# Patient Record
Sex: MALE | Race: WHITE | NOT HISPANIC OR LATINO | URBAN - METROPOLITAN AREA
[De-identification: names, ages, dates, MRNs, and addresses within clinical notes are randomized per-mention and may not be internally consistent; named-entity substitution may affect disease eponyms.]

---

## 2017-08-02 ENCOUNTER — OFFICE VISIT - HEALTHEAST (OUTPATIENT)
Dept: FAMILY MEDICINE | Facility: CLINIC | Age: 37
End: 2017-08-02

## 2017-08-02 DIAGNOSIS — R51.9 HEADACHE: ICD-10-CM

## 2017-08-02 DIAGNOSIS — M79.10 MYALGIA: ICD-10-CM

## 2017-08-02 RX ORDER — IBUPROFEN 200 MG
200 TABLET ORAL EVERY 6 HOURS PRN
Status: SHIPPED | COMMUNITY
Start: 2017-08-02

## 2017-08-03 ENCOUNTER — COMMUNICATION - HEALTHEAST (OUTPATIENT)
Dept: FAMILY MEDICINE | Facility: CLINIC | Age: 37
End: 2017-08-03

## 2021-05-31 VITALS — WEIGHT: 155.8 LBS | BODY MASS INDEX: 23.01 KG/M2

## 2021-06-12 NOTE — PROGRESS NOTES
Subjective:      Patient ID: Arjun Crowder is a 36 y.o. male.    Chief Complaint:    HPI  Patient comes in for evaluation of a tick bite on the right lateral abdomen that occurred about a week ago.  He actually saw the tick crawling on him in before he was able to grab it off it had bit him.  Since then he is also been complaining of a posterior headache with some radiation down his neck.  He has been having some myalgias and some knee stiffness.    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  He has significant tick exposure as well as mosquito exposure.  Several people in his work group were diagnosed flight disease.  Is concerned about possible Lyme disease.    Past Medical History:   Diagnosis Date     Hx of anaphylaxis     Due to multiple bee stings.       No past surgical history on file.    No family history on file.    Social History   Substance Use Topics     Smoking status: Not on file     Smokeless tobacco: Not on file     Alcohol use Not on file       Review of Systems    Objective:     /58  Pulse 65  Temp 98.8  F (37.1  C) (Oral)   Resp 12  Wt 155 lb 12.8 oz (70.7 kg)  SpO2 100%  BMI 23.01 kg/m2    Physical Exam   Constitutional: He appears well-developed and well-nourished. No distress.   HENT:   Mouth/Throat: Oropharynx is clear and moist.   Neck: Neck supple.   Slight tenderness in the upper neck near the occipital protuberances as well as down in the upper trapezius muscles.  No midline tenderness.  Neck is supple and is able look at the ceiling and put his chin towards his chest without any difficulty.   Cardiovascular: Normal rate.    Pulmonary/Chest: Effort normal. No respiratory distress.   Musculoskeletal: Normal range of motion.   Neurological: He is alert.   Skin: Skin is warm and dry. No rash noted.   Single punctate lesion on the  right lateral abdomen.  No surrounding redness or increased warmth.       Assessment and Plan:      Procedures    The primary encounter diagnosis was Headache. A diagnosis of Myalgia was also pertinent to this visit.     Possible Lyme disease with new onset myalgia and headaches.  Patient has significant risk factors for possible Lyme disease.    I will treat him for Lyme disease with doxycycline.  I recommend continuing the antibiotic even  if the Lyme antibody test comes back negative.      Patient Instructions     Symptoms consistent with lyme disease.    Will test     Start the antibiotic and continue even if the blood test is negative.

## 2025-03-04 ENCOUNTER — HOSPITAL ENCOUNTER (EMERGENCY)
Facility: HOSPITAL | Age: 45
Discharge: HOME | End: 2025-03-04
Admitting: PHYSICIAN ASSISTANT
Payer: COMMERCIAL

## 2025-03-04 ENCOUNTER — APPOINTMENT (OUTPATIENT)
Dept: MRI IMAGING | Facility: HOSPITAL | Age: 45
End: 2025-03-04
Attending: PHYSICIAN ASSISTANT
Payer: COMMERCIAL

## 2025-03-04 VITALS
OXYGEN SATURATION: 98 % | TEMPERATURE: 99 F | WEIGHT: 162.1 LBS | DIASTOLIC BLOOD PRESSURE: 107 MMHG | SYSTOLIC BLOOD PRESSURE: 153 MMHG | BODY MASS INDEX: 24.57 KG/M2 | HEART RATE: 110 BPM | HEIGHT: 68 IN | RESPIRATION RATE: 17 BRPM

## 2025-03-04 DIAGNOSIS — Q27.30 AVM (ARTERIOVENOUS MALFORMATION): ICD-10-CM

## 2025-03-04 DIAGNOSIS — R51.9 SUDDEN ONSET OF SEVERE HEADACHE: ICD-10-CM

## 2025-03-04 DIAGNOSIS — R41.89 EPISODE OF ALTERED COGNITION: ICD-10-CM

## 2025-03-04 LAB
ANION GAP SERPL CALCULATED.3IONS-SCNC: 12 MMOL/L (ref 7–15)
APTT PPP: 27 SECONDS (ref 22–38)
BASOPHILS # BLD AUTO: 0.1 10E3/UL (ref 0–0.2)
BASOPHILS NFR BLD AUTO: 1 %
BUN SERPL-MCNC: 10.9 MG/DL (ref 6–20)
CALCIUM SERPL-MCNC: 10.2 MG/DL (ref 8.8–10.4)
CHLORIDE SERPL-SCNC: 103 MMOL/L (ref 98–107)
CREAT SERPL-MCNC: 0.88 MG/DL (ref 0.67–1.17)
EGFRCR SERPLBLD CKD-EPI 2021: >90 ML/MIN/1.73M2
EOSINOPHIL # BLD AUTO: 0.2 10E3/UL (ref 0–0.7)
EOSINOPHIL NFR BLD AUTO: 2 %
ERYTHROCYTE [DISTWIDTH] IN BLOOD BY AUTOMATED COUNT: 13.2 % (ref 10–15)
GLUCOSE SERPL-MCNC: 124 MG/DL (ref 70–99)
HCO3 SERPL-SCNC: 25 MMOL/L (ref 22–29)
HCT VFR BLD AUTO: 45 % (ref 40–53)
HGB BLD-MCNC: 14.9 G/DL (ref 13.3–17.7)
IMM GRANULOCYTES # BLD: 0 10E3/UL
IMM GRANULOCYTES NFR BLD: 0 %
INR PPP: 0.92 (ref 0.85–1.15)
LYMPHOCYTES # BLD AUTO: 2 10E3/UL (ref 0.8–5.3)
LYMPHOCYTES NFR BLD AUTO: 22 %
MCH RBC QN AUTO: 29.4 PG (ref 26.5–33)
MCHC RBC AUTO-ENTMCNC: 33.1 G/DL (ref 31.5–36.5)
MCV RBC AUTO: 89 FL (ref 78–100)
MONOCYTES # BLD AUTO: 0.7 10E3/UL (ref 0–1.3)
MONOCYTES NFR BLD AUTO: 8 %
NEUTROPHILS # BLD AUTO: 6 10E3/UL (ref 1.6–8.3)
NEUTROPHILS NFR BLD AUTO: 67 %
NRBC # BLD AUTO: 0 10E3/UL
NRBC BLD AUTO-RTO: 0 /100
PLATELET # BLD AUTO: 331 10E3/UL (ref 150–450)
POTASSIUM SERPL-SCNC: 4.2 MMOL/L (ref 3.4–5.3)
RBC # BLD AUTO: 5.07 10E6/UL (ref 4.4–5.9)
SODIUM SERPL-SCNC: 140 MMOL/L (ref 135–145)
TROPONIN T SERPL HS-MCNC: <6 NG/L
WBC # BLD AUTO: 8.9 10E3/UL (ref 4–11)

## 2025-03-04 PROCEDURE — 36415 COLL VENOUS BLD VENIPUNCTURE: CPT | Performed by: PHYSICIAN ASSISTANT

## 2025-03-04 PROCEDURE — 255N000002 HC RX 255 OP 636: Performed by: PHYSICIAN ASSISTANT

## 2025-03-04 PROCEDURE — 82374 ASSAY BLOOD CARBON DIOXIDE: CPT | Performed by: PHYSICIAN ASSISTANT

## 2025-03-04 PROCEDURE — A9585 GADOBUTROL INJECTION: HCPCS | Performed by: PHYSICIAN ASSISTANT

## 2025-03-04 PROCEDURE — 85730 THROMBOPLASTIN TIME PARTIAL: CPT | Performed by: PHYSICIAN ASSISTANT

## 2025-03-04 PROCEDURE — 93005 ELECTROCARDIOGRAM TRACING: CPT | Performed by: PHYSICIAN ASSISTANT

## 2025-03-04 PROCEDURE — 84484 ASSAY OF TROPONIN QUANT: CPT | Performed by: PHYSICIAN ASSISTANT

## 2025-03-04 PROCEDURE — 70544 MR ANGIOGRAPHY HEAD W/O DYE: CPT

## 2025-03-04 PROCEDURE — 85610 PROTHROMBIN TIME: CPT | Performed by: PHYSICIAN ASSISTANT

## 2025-03-04 PROCEDURE — 80048 BASIC METABOLIC PNL TOTAL CA: CPT | Performed by: PHYSICIAN ASSISTANT

## 2025-03-04 PROCEDURE — 85004 AUTOMATED DIFF WBC COUNT: CPT | Performed by: PHYSICIAN ASSISTANT

## 2025-03-04 PROCEDURE — 70549 MR ANGIOGRAPH NECK W/O&W/DYE: CPT

## 2025-03-04 PROCEDURE — 99285 EMERGENCY DEPT VISIT HI MDM: CPT | Mod: 25

## 2025-03-04 PROCEDURE — 70553 MRI BRAIN STEM W/O & W/DYE: CPT

## 2025-03-04 RX ORDER — GADOBUTROL 604.72 MG/ML
8 INJECTION INTRAVENOUS ONCE
Status: COMPLETED | OUTPATIENT
Start: 2025-03-04 | End: 2025-03-04

## 2025-03-04 RX ADMIN — GADOBUTROL 8 ML: 604.72 INJECTION INTRAVENOUS at 16:37

## 2025-03-04 ASSESSMENT — COLUMBIA-SUICIDE SEVERITY RATING SCALE - C-SSRS
2. HAVE YOU ACTUALLY HAD ANY THOUGHTS OF KILLING YOURSELF IN THE PAST MONTH?: NO
6. HAVE YOU EVER DONE ANYTHING, STARTED TO DO ANYTHING, OR PREPARED TO DO ANYTHING TO END YOUR LIFE?: NO
1. IN THE PAST MONTH, HAVE YOU WISHED YOU WERE DEAD OR WISHED YOU COULD GO TO SLEEP AND NOT WAKE UP?: NO

## 2025-03-04 ASSESSMENT — ACTIVITIES OF DAILY LIVING (ADL)
ADLS_ACUITY_SCORE: 41
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
## 2025-03-05 ENCOUNTER — TELEPHONE (OUTPATIENT)
Dept: NEUROSURGERY | Facility: CLINIC | Age: 45
End: 2025-03-05
Payer: COMMERCIAL

## 2025-03-05 LAB
ATRIAL RATE - MUSE: 83 BPM
DIASTOLIC BLOOD PRESSURE - MUSE: NORMAL MMHG
INTERPRETATION ECG - MUSE: NORMAL
P AXIS - MUSE: 72 DEGREES
PR INTERVAL - MUSE: 142 MS
QRS DURATION - MUSE: 82 MS
QT - MUSE: 354 MS
QTC - MUSE: 415 MS
R AXIS - MUSE: 2 DEGREES
SYSTOLIC BLOOD PRESSURE - MUSE: NORMAL MMHG
T AXIS - MUSE: 27 DEGREES
VENTRICULAR RATE- MUSE: 83 BPM

## 2025-03-05 NOTE — TELEPHONE ENCOUNTER
RECORDS RECEIVED FROM: Internal    REASON FOR VISIT: AVM (arteriovenous malformation)   PROVIDER: Rosana Rosas MD   DATE OF APPT: 3/12/25 @ 3:00 pm    NOTES (FOR ALL VISITS) STATUS DETAILS   OFFICE NOTE from referring provider Internal ED Referral    DISCHARGE REPORT from the ER Internal 3/4/25 Tremayne Stone PA-C @[facility] ED     MEDICATION LIST Internal    IMAGING  (FOR ALL VISITS)     MRI (HEAD, NECK, SPINE) Internal [facility]  3/4/25 MRA Neck (Carotid)  3/4/25 MRA Brain (COW)  3/4/25 MR Brain
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
## 2025-03-05 NOTE — DISCHARGE INSTRUCTIONS
You were seen here in the emergency department for evaluation of altered mental status, noted to have an AVM malformation within the left temporal portion of your brain.  I discussed with both community neurosurgery here at [facility] and [facility] neurosurgery who recommends follow-up with Dr. Rosas, neurosurgery at the [facility].  A referral has been placed in your information has been passed on, they will contact you for follow-up appointments.  If you develop new or worsening symptoms such as sudden onset headache, vision changes, numbness or tingling, return to the emergency department immediately for further evaluation.

## 2025-03-12 ENCOUNTER — PRE VISIT (OUTPATIENT)
Dept: NEUROSURGERY | Facility: CLINIC | Age: 45
End: 2025-03-12

## 2025-03-12 ENCOUNTER — VIRTUAL VISIT (OUTPATIENT)
Dept: NEUROSURGERY | Facility: CLINIC | Age: 45
End: 2025-03-12
Payer: COMMERCIAL

## 2025-03-12 DIAGNOSIS — Q27.30 AVM (ARTERIOVENOUS MALFORMATION): ICD-10-CM

## 2025-03-12 PROCEDURE — 98011 SYNCH AUDIO-ONLY NEW HIGH 60: CPT | Performed by: NEUROLOGICAL SURGERY

## 2025-03-12 PROCEDURE — 1126F AMNT PAIN NOTED NONE PRSNT: CPT | Performed by: NEUROLOGICAL SURGERY

## 2025-03-12 ASSESSMENT — PAIN SCALES - GENERAL: PAINLEVEL_OUTOF10: NO PAIN (0)
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
## 2025-03-12 NOTE — NURSING NOTE
Is the patient currently in the state of [place]? YES    Visit mode:TELEPHONE    If the visit is dropped, the patient can be reconnected by: TELEPHONE VISIT: Phone number:   Telephone Information:   Mobile [phone]       Will anyone else be joining the visit? NO  (If patient encounters technical issues they should call [phone] :[id number])    How would you like to obtain your AVS? MyChart    Are changes needed to the allergy or medication list? No    Are refills needed on medications prescribed by this physician? NO    Reason for visit: Consult    Caroline ROBERT

## 2025-03-13 ENCOUNTER — TELEPHONE (OUTPATIENT)
Dept: NEUROSURGERY | Facility: CLINIC | Age: 45
End: 2025-03-13
Payer: COMMERCIAL

## 2025-03-13 ENCOUNTER — HOSPITAL ENCOUNTER (OUTPATIENT)
Facility: CLINIC | Age: 45
End: 2025-03-13
Admitting: NEUROLOGICAL SURGERY
Payer: COMMERCIAL

## 2025-03-13 NOTE — TELEPHONE ENCOUNTER
I called patient in regards to IR procedure with Dr. Bob Rosas    Procedure Date: 3/21/2025    Arrival: 1030    Loction: [facility] IR [facility]    Anesthesia Type: IV Sedation    Patient was informed that they will be at the hospital 5-6 hours and that they will need a . Patient verbalized understanding.    Notes:   - Patient is aware that the time can change due to the EEG being scheduled prior to diagnostic angiogram        Dexter Cortez on 3/13/2025 at 12:32 PM ]

## 2025-03-13 NOTE — PATIENT INSTRUCTIONS
Scheduling will contact you to make arrangements for your angiogram and will try to schedule EEG the same morning. You will need a  home and someone that can stay with you through the night. Do not eat for 8 hours prior to arrival time. You may drink clear liquids (includes water, Jell-O, clear broth, apple juice or any non-carbonated beverage that you can see through) until 2 hours prior to arrival time. You may take your medications with a sip of water. You will check in at the [facility] waiting room at the [facility] 1.5 hours prior to your procedure. You will receive written instructions via mail after your procedure has been scheduled.      If you have questions regarding your procedure, please contact us at [phone], option 1.    If you need to cancel, reschedule or have procedure scheduling related questions, please call Neuroendovascular IR Procedure Scheduling at [phone].     Thank you,  Lucía Richards, RN & Queenie Hutson RN, CNRN, SCRN  Stroke & Endovascular Care Coordinators

## 2025-03-31 ENCOUNTER — PATIENT OUTREACH (OUTPATIENT)
Dept: NEUROSURGERY | Facility: CLINIC | Age: 45
End: 2025-03-31
Payer: COMMERCIAL

## 2025-03-31 NOTE — PATIENT INSTRUCTIONS
You are scheduled for a Diagnostic Cerebral Angiogram with Dr. Rosas on 4/16/25.     EEG Arrival Time: 7:45am.   EEG Time: 8:00am.  Angiogram Arrival: 10:30am  Angiogram start time: 12:00pm    Please follow these instructions:    *For the EEG: please arrive to [address], [address] [facility] by 7:45am for check in. The EEG will begin at 8am.    * For the angiogram: Check in at the [facility] Interventional [facility]iology. The address is [address] [place] [address]. Enter at door 2, closest to [address]. The phone number is [phone].     * Nothing to eat for 8 hours prior to arrival time. You may drink clear liquids (includes water, Jell-O, clear broth, apple juice or any non-carbonated beverage that you can see through) up until 2 hours prior to arrival time.     * Please note: if you are taking a GLP-1 agonist you must HOLD as follows:   Hold seven (7) days prior for once weekly injectable doses [semaglutide (Ozempic, Wegovy), dulaglutide  (Trulicity), exenatide ER (Bydureon), tirzepatide (Mounjaro)]   Hold the day before and day of for once daily injectable GLP-1 agonists [exenatide (Byetta), liraglutide (Saxenda,  Victoza)]   Hold seven (7) days for oral semaglutide (Rybelsus)     * You may take your other medications with a sip of water the morning of the procedure.    * You will be discharged the same day. You must have a  home and someone that can stay with you through the night.     COVID-19 visitors policy: Adult surgical and procedural patients can have two visitors throughout the surgery process. The two visitors may include children of any age; please note, children cannot stay in the waiting room alone.    All discharge instructions will be given to the  or volunteer. Documentation for the post-operative plan will be given to the patient and . Patients are required to have someone to stay with them for 24 hours after their  procedure.    If you have questions regarding your procedure, please contact me at [phone], option 1.    If you need to cancel, reschedule or have procedure scheduling related questions, please call Neuroendovascular IR Procedure Scheduling at [phone].    Thank you,  Queenie Hutson, RN, CNRN, SCRN  Lucía Richards, RN, BSN  Stroke & Neuroendovascular Care Coordinator    Cerebral Angiogram - What to Expect    You are scheduled for an angiogram, which is a procedure that uses x-rays to view the arteries (blood vessels that carry blood from the heart out to the body).     After you arrive, the nursing staff will take a short history and assessment before the procedure begins. Your physician will explain the procedure to you and your family, including the possible risks and side effects.  Be sure to ask questions and address any concerns you may have. You will then be asked to sign a consent form for the procedure.     During the procedure a small tube or catheter will be placed into one of your arteries (either the femoral artery in the groin, or the radial artery in the arm)  and maneuvered to the specific artery being studied.  Contrast medium (x-ray dye) will be injected into the blood vessel and x-rays will be taken of the area.    Once the procedure is completed the catheter will be removed and pressure held at the puncture site for 20 to 30 minutes to make sure the puncture site seals. An internal closure device may be used to secure the arterial puncture as well.     During the exam, you are routinely monitored by a heart monitor and an oxygen saturation monitor that lets us know how well you are breathing.  A blood pressure cuff will be on your arm.  An IV is started to provide you with medications and IV fluids during the procedure.    After the exam you will need to be on complete bed rest for 2 to 6 hours.  The nurse will monitor your vital signs, puncture site, pulses and temperature of the area  that was punctured.     After you are discharged do not drive until the next morning and an adult must be with you until then. You may resume your normal activities the day after the procedure.  Do not do any strenuous exercise or lifting for 48 hours after the procedure.     Preparation:   Laboratory tests will be obtained by your doctor the day of the exam (Basic Metabolic Panel, CBCP, PTT and INR).  Someone will need to drive you to and from the hospital.  Be sure to have someone who can stay with you through the night.   If you are allergic to x-ray contrast or iodine, contact your doctor or the nurse coordinator prior to the exam day for instructions.  If you are or may be pregnant contact your doctor or nurse coordinator prior to the day of the exam.  If you have diabetes, check with your doctor or the RN Care Coordinator to see if your insulin should be adjusted for the exam.  If you are taking a medication called Glucophage, Glucovance, or Metformin; these medications need to be held the day of the exam and for approximately 48 hours following the procedure.   If you are taking a GLP-1 agonist, please hold as instructed above.   If you are taking Coumadin (warfarin), Eliquis (apixaban), Xarelto (rivaroxaban), or any other blood thinners/anticoagulants, please contact the RN Care Coordinator at least 7 days before the exam for special instructions.  You should not have received barium (x-ray contrast) within 48 hours of this procedure.   Do not smoke for 24 hours prior to the procedure.  Do not eat for 8 hours prior to arrival time. You may drink clear liquids (water, ginger ale, etc) until 2 hours prior to arrival time.  You may brush your teeth and take your medications as directed with a sip of water.    Feel free to contact Queenie Hutson or Lucía Richards, our RN Care Coordinators, at [phone] with any questions or concerns. After business hours, call the  at [phone] and have the  Neuro-Interventional Fellow paged.

## 2025-03-31 NOTE — PROGRESS NOTES
Informed patient of procedure instructions. Confirmed date and time. Patient verbalized understanding. All questions answered. Patient instructions sent via Xtone. Patient has my contact information and was encouraged to call with questions/concerns.   Lucía Richards RN 3/31/2025 9:48 AM

## 2025-04-06 ENCOUNTER — HEALTH MAINTENANCE LETTER (OUTPATIENT)
Age: 45
End: 2025-04-06

## 2025-04-16 ENCOUNTER — MYC MEDICAL ADVICE (OUTPATIENT)
Dept: NEUROSURGERY | Facility: CLINIC | Age: 45
End: 2025-04-16

## 2025-04-16 ENCOUNTER — APPOINTMENT (OUTPATIENT)
Dept: INTERVENTIONAL RADIOLOGY/VASCULAR | Facility: CLINIC | Age: 45
End: 2025-04-16
Attending: NEUROLOGICAL SURGERY
Payer: COMMERCIAL

## 2025-04-16 ENCOUNTER — ANCILLARY PROCEDURE (OUTPATIENT)
Dept: NEUROLOGY | Facility: CLINIC | Age: 45
End: 2025-04-16
Attending: NEUROLOGICAL SURGERY
Payer: COMMERCIAL

## 2025-04-16 ENCOUNTER — HOSPITAL ENCOUNTER (OUTPATIENT)
Facility: CLINIC | Age: 45
Discharge: HOME | End: 2025-04-16
Admitting: NEUROLOGICAL SURGERY
Payer: COMMERCIAL

## 2025-04-16 VITALS
WEIGHT: 151 LBS | RESPIRATION RATE: 16 BRPM | HEIGHT: 69 IN | SYSTOLIC BLOOD PRESSURE: 117 MMHG | TEMPERATURE: 97.9 F | DIASTOLIC BLOOD PRESSURE: 75 MMHG | HEART RATE: 72 BPM | BODY MASS INDEX: 22.36 KG/M2 | OXYGEN SATURATION: 98 %

## 2025-04-16 DIAGNOSIS — Q27.30 AVM (ARTERIOVENOUS MALFORMATION): ICD-10-CM

## 2025-04-16 LAB
ANION GAP SERPL CALCULATED.3IONS-SCNC: 13 MMOL/L (ref 7–15)
APTT PPP: 28 SECONDS (ref 22–38)
BUN SERPL-MCNC: 13.3 MG/DL (ref 6–20)
CALCIUM SERPL-MCNC: 9.2 MG/DL (ref 8.8–10.4)
CHLORIDE SERPL-SCNC: 102 MMOL/L (ref 98–107)
CREAT SERPL-MCNC: 0.89 MG/DL (ref 0.67–1.17)
EGFRCR SERPLBLD CKD-EPI 2021: >90 ML/MIN/1.73M2
ERYTHROCYTE [DISTWIDTH] IN BLOOD BY AUTOMATED COUNT: 13.1 % (ref 10–15)
GLUCOSE SERPL-MCNC: 108 MG/DL (ref 70–99)
HCO3 SERPL-SCNC: 25 MMOL/L (ref 22–29)
HCT VFR BLD AUTO: 43.4 % (ref 40–53)
HGB BLD-MCNC: 14.5 G/DL (ref 13.3–17.7)
INR PPP: 0.95 (ref 0.85–1.15)
MCH RBC QN AUTO: 29.9 PG (ref 26.5–33)
MCHC RBC AUTO-ENTMCNC: 33.4 G/DL (ref 31.5–36.5)
MCV RBC AUTO: 90 FL (ref 78–100)
PLATELET # BLD AUTO: 302 10E3/UL (ref 150–450)
POTASSIUM SERPL-SCNC: 3.7 MMOL/L (ref 3.4–5.3)
RBC # BLD AUTO: 4.85 10E6/UL (ref 4.4–5.9)
SODIUM SERPL-SCNC: 140 MMOL/L (ref 135–145)
WBC # BLD AUTO: 7.9 10E3/UL (ref 4–11)

## 2025-04-16 PROCEDURE — 85014 HEMATOCRIT: CPT | Performed by: INTERNAL MEDICINE

## 2025-04-16 PROCEDURE — 85610 PROTHROMBIN TIME: CPT | Performed by: INTERNAL MEDICINE

## 2025-04-16 PROCEDURE — 36227 PLACE CATH XTRNL CAROTID: CPT | Mod: LT | Performed by: NEUROLOGICAL SURGERY

## 2025-04-16 PROCEDURE — 258N000003 HC RX IP 258 OP 636: Performed by: INTERNAL MEDICINE

## 2025-04-16 PROCEDURE — 95713 VEEG 2-12 HR CONT MNTR: CPT | Performed by: STUDENT IN AN ORGANIZED HEALTH CARE EDUCATION/TRAINING PROGRAM

## 2025-04-16 PROCEDURE — 250N000009 HC RX 250: Performed by: INTERNAL MEDICINE

## 2025-04-16 PROCEDURE — 85730 THROMBOPLASTIN TIME PARTIAL: CPT | Performed by: INTERNAL MEDICINE

## 2025-04-16 PROCEDURE — 250N000011 HC RX IP 250 OP 636: Performed by: INTERNAL MEDICINE

## 2025-04-16 PROCEDURE — 95718 EEG PHYS/QHP 2-12 HR W/VEEG: CPT | Performed by: STUDENT IN AN ORGANIZED HEALTH CARE EDUCATION/TRAINING PROGRAM

## 2025-04-16 PROCEDURE — 99152 MOD SED SAME PHYS/QHP 5/>YRS: CPT | Mod: GC | Performed by: NEUROLOGICAL SURGERY

## 2025-04-16 PROCEDURE — 36224 PLACE CATH CAROTD ART: CPT | Mod: XS | Performed by: NEUROLOGICAL SURGERY

## 2025-04-16 PROCEDURE — C1769 GUIDE WIRE: HCPCS

## 2025-04-16 PROCEDURE — 36224 PLACE CATH CAROTD ART: CPT | Mod: 50

## 2025-04-16 PROCEDURE — 80048 BASIC METABOLIC PNL TOTAL CA: CPT | Performed by: INTERNAL MEDICINE

## 2025-04-16 PROCEDURE — C1887 CATHETER, GUIDING: HCPCS

## 2025-04-16 PROCEDURE — 36224 PLACE CATH CAROTD ART: CPT | Mod: RT | Performed by: NEUROLOGICAL SURGERY

## 2025-04-16 PROCEDURE — 255N000002 HC RX 255 OP 636: Performed by: NEUROLOGICAL SURGERY

## 2025-04-16 PROCEDURE — 36415 COLL VENOUS BLD VENIPUNCTURE: CPT | Performed by: INTERNAL MEDICINE

## 2025-04-16 PROCEDURE — 272N000567 HC SHEATH CR4

## 2025-04-16 PROCEDURE — 95700 EEG CONT REC W/VID EEG TECH: CPT | Performed by: STUDENT IN AN ORGANIZED HEALTH CARE EDUCATION/TRAINING PROGRAM

## 2025-04-16 PROCEDURE — 36226 PLACE CATH VERTEBRAL ART: CPT | Mod: LT | Performed by: NEUROLOGICAL SURGERY

## 2025-04-16 RX ORDER — LIDOCAINE 40 MG/G
CREAM TOPICAL
Status: DISCONTINUED | OUTPATIENT
Start: 2025-04-16 | End: 2025-04-16 | Stop reason: HOSPADM

## 2025-04-16 RX ORDER — NALOXONE HYDROCHLORIDE 0.4 MG/ML
0.2 INJECTION, SOLUTION INTRAMUSCULAR; INTRAVENOUS; SUBCUTANEOUS
Status: DISCONTINUED | OUTPATIENT
Start: 2025-04-16 | End: 2025-04-16 | Stop reason: HOSPADM

## 2025-04-16 RX ORDER — SODIUM CHLORIDE 9 MG/ML
INJECTION, SOLUTION INTRAVENOUS CONTINUOUS
Status: DISCONTINUED | OUTPATIENT
Start: 2025-04-16 | End: 2025-04-16 | Stop reason: HOSPADM

## 2025-04-16 RX ORDER — HEPARIN SODIUM 200 [USP'U]/100ML
1 INJECTION, SOLUTION INTRAVENOUS CONTINUOUS PRN
Status: DISCONTINUED | OUTPATIENT
Start: 2025-04-16 | End: 2025-04-16 | Stop reason: HOSPADM

## 2025-04-16 RX ORDER — FENTANYL CITRATE 50 UG/ML
25-50 INJECTION, SOLUTION INTRAMUSCULAR; INTRAVENOUS EVERY 5 MIN PRN
Status: DISCONTINUED | OUTPATIENT
Start: 2025-04-16 | End: 2025-04-16 | Stop reason: HOSPADM

## 2025-04-16 RX ORDER — PROCHLORPERAZINE MALEATE 10 MG
10 TABLET ORAL EVERY 6 HOURS PRN
Status: DISCONTINUED | OUTPATIENT
Start: 2025-04-16 | End: 2025-04-16 | Stop reason: HOSPADM

## 2025-04-16 RX ORDER — NALOXONE HYDROCHLORIDE 0.4 MG/ML
0.4 INJECTION, SOLUTION INTRAMUSCULAR; INTRAVENOUS; SUBCUTANEOUS
Status: DISCONTINUED | OUTPATIENT
Start: 2025-04-16 | End: 2025-04-16 | Stop reason: HOSPADM

## 2025-04-16 RX ORDER — ONDANSETRON 2 MG/ML
4 INJECTION INTRAMUSCULAR; INTRAVENOUS EVERY 6 HOURS PRN
Status: DISCONTINUED | OUTPATIENT
Start: 2025-04-16 | End: 2025-04-16 | Stop reason: HOSPADM

## 2025-04-16 RX ORDER — FLUMAZENIL 0.1 MG/ML
0.2 INJECTION, SOLUTION INTRAVENOUS
Status: DISCONTINUED | OUTPATIENT
Start: 2025-04-16 | End: 2025-04-16 | Stop reason: HOSPADM

## 2025-04-16 RX ORDER — ONDANSETRON 4 MG/1
4 TABLET, ORALLY DISINTEGRATING ORAL EVERY 6 HOURS PRN
Status: DISCONTINUED | OUTPATIENT
Start: 2025-04-16 | End: 2025-04-16 | Stop reason: HOSPADM

## 2025-04-16 RX ORDER — IOPAMIDOL 612 MG/ML
100 INJECTION, SOLUTION INTRAVASCULAR ONCE
Status: COMPLETED | OUTPATIENT
Start: 2025-04-16 | End: 2025-04-16

## 2025-04-16 RX ORDER — ACETAMINOPHEN 325 MG/1
325 TABLET ORAL EVERY 4 HOURS PRN
Status: DISCONTINUED | OUTPATIENT
Start: 2025-04-16 | End: 2025-04-16 | Stop reason: HOSPADM

## 2025-04-16 RX ORDER — HEPARIN SODIUM 200 [USP'U]/100ML
1 INJECTION, SOLUTION INTRAVENOUS EVERY 5 MIN PRN
Status: DISCONTINUED | OUTPATIENT
Start: 2025-04-16 | End: 2025-04-16 | Stop reason: HOSPADM

## 2025-04-16 RX ADMIN — MIDAZOLAM 1 MG: 1 INJECTION INTRAMUSCULAR; INTRAVENOUS at 13:34

## 2025-04-16 RX ADMIN — FENTANYL CITRATE 50 MCG: 50 INJECTION, SOLUTION INTRAMUSCULAR; INTRAVENOUS at 13:28

## 2025-04-16 RX ADMIN — MIDAZOLAM 1 MG: 1 INJECTION INTRAMUSCULAR; INTRAVENOUS at 13:15

## 2025-04-16 RX ADMIN — MIDAZOLAM 1 MG: 1 INJECTION INTRAMUSCULAR; INTRAVENOUS at 13:28

## 2025-04-16 RX ADMIN — IOPAMIDOL 70 ML: 612 INJECTION, SOLUTION INTRAVENOUS at 13:40

## 2025-04-16 RX ADMIN — LIDOCAINE HYDROCHLORIDE 10 ML: 10 INJECTION, SOLUTION INFILTRATION; PERINEURAL at 13:22

## 2025-04-16 RX ADMIN — FENTANYL CITRATE 50 MCG: 50 INJECTION, SOLUTION INTRAMUSCULAR; INTRAVENOUS at 13:21

## 2025-04-16 RX ADMIN — MIDAZOLAM 1 MG: 1 INJECTION INTRAMUSCULAR; INTRAVENOUS at 13:22

## 2025-04-16 RX ADMIN — FENTANYL CITRATE 50 MCG: 50 INJECTION, SOLUTION INTRAMUSCULAR; INTRAVENOUS at 13:10

## 2025-04-16 RX ADMIN — FENTANYL CITRATE 50 MCG: 50 INJECTION, SOLUTION INTRAMUSCULAR; INTRAVENOUS at 13:16

## 2025-04-16 RX ADMIN — SODIUM CHLORIDE: 0.9 INJECTION, SOLUTION INTRAVENOUS at 12:35

## 2025-04-16 RX ADMIN — MIDAZOLAM 1 MG: 1 INJECTION INTRAMUSCULAR; INTRAVENOUS at 13:10

## 2025-04-16 ASSESSMENT — ACTIVITIES OF DAILY LIVING (ADL)
ADLS_ACUITY_SCORE: 41

## 2025-04-16 NOTE — PROGRESS NOTES
Care Suites Post Procedure Note    Patient Information  Name: Arjun Crowder  Age: 44 year old    Post Procedure  Time patient returned to Care Suites: 1350  Concerns/abnormal assessment: N/A, Neuro's intact ex L bottom side of L foot numbness present PTA. R groin dressing CDI  If abnormal assessment, provider notified: N/A  Plan/Other: per orders     Amy Jenkins RN
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
## 2025-04-16 NOTE — IR NOTE
Interventional Radiology Intra-procedural Nursing Note    Patient Name: Arjun Crowder  Medical Record Number: [id number]  Today's Date: April 16, 2025    Procedure: diagnostic cerebral angiogram with moderate sedation  Start time: 1310  End time: 1335  Report provided to: Tima SUNG  Patient depart time and location: 1345 to cs04    Note: Patient entered Interventional Radiology Suite number 3 via cart. Patient awake, alert and oriented. Assisted onto procedural table in supine position. Prepped and draped.  Dr. Jhaveri in room. Time out and procedure started. Vital signs stable. Telemetry reading sinus rhythm.    Procedure well tolerated by patient without complications. Procedure end with debrief by Dr. Jhaveri.  Manual pressure applied until hemostasis achieved. Gauze/tegaderm dressing applied to right femoral interventional procedure access site.    Bedrest for 2 hours until 1535.    Administered medication totals:  Lidocaine 1% 10 mL Intradermal  Versed 5 mg IVP  Fentanyl 200 mcg IVP    Last dose of sedation administered at 1334.

## 2025-04-16 NOTE — PROGRESS NOTES
Care Suites Discharge Nursing Note    Patient Information  Name: Arjun Crowder  Age: 44 year old    Discharge Education:  Discharge instructions reviewed: Yes  Patient/patient representative verbalizes understanding: Yes  Patient discharging on new medications: No  Medication education completed: Yes    Discharge Plans:   Discharge location: home  Discharge ride contacted: Yes  Approximate discharge time: 1630    Discharge Criteria:  Discharge criteria met and vital signs stable: Yes    Patient Belongs:  Patient belongings returned to patient: Yes    Amy Jenkins RN

## 2025-04-16 NOTE — DISCHARGE INSTRUCTIONS
Cerebral Angiogram Discharge Instructions - Femoral     After you go home:    Have an adult stay with you until tomorrow.  Drink extra fluids for 2 days.  You may resume your normal diet.  No smoking       For 24 hours - due to the sedation you received:  Relax and take it easy.  Do NOT make any important or legal decisions.  Do NOT drive or operate machines at home or at work.  Do NOT drink alcohol.    Care of Groin Puncture Site:    For the first 24 hrs - check the puncture site every 1-2 hours while awake.  For 2 days, when you cough, sneeze, laugh or move your bowels, hold your hand over the puncture site and press firmly.  Remove the bandaid after 24 hours. If there is minor oozing, apply another bandaid and remove it after 12 hours.  It is normal to have a small bruise or pea size lump at the site.  You may shower tomorrow. Do NOT take a bath, or use a hot tub or pool for at least 3 days. Do NOT scrub the site. Do not use lotion or powder near the puncture site.     Activity:            For 2 days:  No stooping or squatting  Do NOT do any heavy activity such as exercise, lifting, or straining.   No housework, yard work or any activity that make you sweat  Do NOT lift more than 10 pounds    Bleeding:    If you start bleeding from the site in your groin, lie down flat and press firmly on/above the site for 10 minutes.   Once bleeding stops, lay flat for 2 hours.   Call [facility] as soon as you can.       Call 911 right away if you have heavy bleeding or bleeding that does not stop.      Medicines:    Hold Metformin for 48 hours after your procedure.  If you are taking an antiplatelet medication such as Plavix, do not stop taking it until you talk to your provider.     Take your medications, including blood thinners, unless your provider tells you not to.   If you take Coumadin (Warfarin), have your INR checked by your provider in  3-5 days. Call your clinic to schedule this.  If you have stopped  any medicines, check with your provider about when to restart them.        Follow Up Appointments:    Follow up with [facility] as directed.    Call the clinic if:    You have increased pain or a large or growing hard lump around the site.  The site is red, swollen, hot or tender.  Blood or fluid is draining from the site.  You have chills or a fever greater than 101 F (38 C).  Your leg feels numb, cool or changes color.  You have hives, a rash or unusual itching.  New pain in the back or belly that you cannot control with Tylenol.  You experience changes in your vision, hearing, balance, coordination, speech, thinking or memory.  You experience weakness in one or more extremity.  Any questions or concerns.    If you have questions or your original symptoms do not improve, call:         [facility]             Real Hutson RN Care Coordinator                   @ [phone] - option #3          Or you may contact your provider via My Chart      Other Info:    Stroke - Call 911    Remember BE FAST:  Balance - Sudden loss of balance or coordination - trouble walking  Eyes - Sudden problem seeing out of one or both eyes  Face - Sudden numbness or change to one side of the face - facial droop or uneven smile  Arm - Sudden numbness or weakness on one arm or leg  Speech - Sudden changes in speech or talking that doesn't make sense  Time - if the person is having any of the above symptoms - CALL 911 immediately.  Symptoms may go away, then come back.  Sudden, worst headache of your life.

## 2025-04-16 NOTE — PROCEDURES
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 The nidus is diffuse, measuring approximately 4 x 3.6cm from contributions of the posterior cerebral artery, along with an anterior component measuring approximately 1.9 x 1.1cm from contributions of the middle cerebral artery.    Plan:    Bedrest 2 hours  Will refer to Dr. Osborne for radiosurgery consult    Primary Surgeon: Dr. Rosana Rsoas  Secondary Surgeon: Not applicable  Secondary Surgeon Review: None  Fellow: Kayla Jhaveri MD  Additional Assistants: none    Prior to the start of the procedure and with procedural staff participation, I verbally confirmed: the patient s identity using two indicators, relevant allergies, that the procedure was appropriate and matched the consent or emergent situation, and that the correct equipment/implants were available. Immediately prior to starting the procedure I conducted the Time Out with the procedural staff and re-confirmed the patient s name, procedure, and site/side. (The Joint Commission universal protocol was followed.)  Yes    PRU value: Not applicable    Anesthesia: Conscious Sedation  Medications:  versed 5mg IV, fentanyl 200mcg IV, lidocaine 1% topical 10mL  Puncture site: Right Femoral Artery    Fluoroscopy time (minutes): 12.6  Radiation dose (mGy):  812.8     Contrast amount (mL):  70      Estimated blood loss (mL): 10    Closure:  "Manual - 10minutes    Bedrest start time 1335 and 2 hours bedrest completed at 1535    Disposition: Home after recovery.        Sedation Post-Procedure Summary    The patient was reevaluated immediately before administering moderate or deep sedation or anesthesia.    Sedatives: as above, under \"medications\"    Vital signs and pulse oximetry were monitored and remained stable throughout the procedure, and sedation was maintained until the procedure was complete.  The patient was monitored by staff until sedation discharge criteria were met.    Patient tolerance: Patient tolerated the procedure well with no immediate complications.    Time of sedation in minutes: 25 minutes from beginning to end of physician one to one monitoring.    Kayla Jhaveri MD  Fellow, Endovascular Surgical Neuroradiology    Use [employer] to contact: Neuro IR Fellow ([place])  "

## 2025-04-16 NOTE — PROGRESS NOTES
Care Suites Admission Nursing Note    Patient Information  Name: Arjun Crowder  Age: 44 year old  Reason for admission: cerbral angio  Care Suites arrival time: 1216    Visitor Information  Name: bryce moser     Patient Admission/Assessment   Pre-procedure assessment complete: Yes  If abnormal assessment/labs, provider notified: N/A  NPO: Yes  Medications held per instructions/orders: N/A  Consent: deferred  If applicable, pregnancy test status: deferred  Patient oriented to room: Yes  Education/questions answered: Yes  Plan/other: cerbral angio    Discharge Planning  Discharge name/phone number: Mauricio [phone]   Overnight post sedation caregiver: above  Discharge location: home    Tima Spivey RN

## 2025-04-16 NOTE — TELEPHONE ENCOUNTER
RN spoke with patient and informed him angio will be able to happen today. RN alerted fellows of possible delay in arrival time due to EEG. Patient verbalized understanding and has no further questions.   Lucía Richards RN 4/16/2025 9:44 AM

## 2025-04-17 ENCOUNTER — PATIENT OUTREACH (OUTPATIENT)
Dept: NEUROSURGERY | Facility: CLINIC | Age: 45
End: 2025-04-17
Payer: COMMERCIAL

## 2025-04-17 ENCOUNTER — TELEPHONE (OUTPATIENT)
Dept: NEUROSURGERY | Facility: CLINIC | Age: 45
End: 2025-04-17
Payer: COMMERCIAL

## 2025-04-17 ENCOUNTER — MYC MEDICAL ADVICE (OUTPATIENT)
Dept: NEUROLOGY | Facility: CLINIC | Age: 45
End: 2025-04-17
Payer: COMMERCIAL

## 2025-04-17 NOTE — TELEPHONE ENCOUNTER
RECORDS RECEIVED FROM: Internal    REASON FOR VISIT: Radiosurgery consult for AVM   PROVIDER: Manuel Osborne MD   DATE OF APPT: 4/22/25 @ 11:00 am    NOTES (FOR ALL VISITS) STATUS DETAILS   OFFICE NOTE from referring provider Internal ED Referral    OFFICE NOTE from other specialist Internal 3/12/25 Rosana Rosas MD @[facility]NeuroSurg     DISCHARGE REPORT from the ER Internal 3/4/25 Tremayne Stone PA-C @[facility] ED     EEG Internal [facility]  4/16/25 EEG Video 3 Hrs Continuous Monitoring     MEDICATION LIST Internal    IMAGING  (FOR ALL VISITS)     IR Internal [facility]  4/16/25 IR Carotid Cerebral Angio Bilat     MRI (HEAD, NECK, SPINE) Internal [facility]  3/4/25 MRA Neck (Carotid)  3/4/25 MRA Brain (COW)  3/4/25 MR Brain

## 2025-04-17 NOTE — PROGRESS NOTES
Endovascular Surgical Neuroradiology - Post Discharge Call    Admit: 4/16/25    Discharge: 4/16/25    Facility: [facility]    MD/Service: Dr. 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 The nidus is diffuse, measuring approximately 4 x 3.6cm from contributions of the posterior cerebral artery, along with an anterior component measuring approximately 1.9 x 1.1cm from contributions of the middle cerebral artery.     Plan:    Bedrest 2 hours  Will refer to Dr. Osborne for radiosurgery consult    RN LVGINA for patient letting him know I was calling to check in and will send a [employer] message. Left my contact information and encouraged patient to return call with questions/concerns.   Lucía Richards RN 4/17/2025 9:09 AM
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
## 2025-04-22 ENCOUNTER — PRE VISIT (OUTPATIENT)
Dept: NEUROSURGERY | Facility: CLINIC | Age: 45
End: 2025-04-22

## 2025-04-22 ENCOUNTER — VIRTUAL VISIT (OUTPATIENT)
Dept: NEUROSURGERY | Facility: CLINIC | Age: 45
End: 2025-04-22
Payer: COMMERCIAL

## 2025-04-22 VITALS — BODY MASS INDEX: 22.96 KG/M2 | WEIGHT: 155 LBS | HEIGHT: 69 IN

## 2025-04-22 DIAGNOSIS — Q28.2 AVM (ARTERIOVENOUS MALFORMATION) BRAIN: Primary | ICD-10-CM

## 2025-04-22 ASSESSMENT — PAIN SCALES - GENERAL: PAINLEVEL_OUTOF10: NO PAIN (0)
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
## 2025-04-22 NOTE — NURSING NOTE
Current patient location:  [place], [place]    Is the patient currently in the state of [place]? YES    Visit mode: VIDEO    If the visit is dropped, the patient can be reconnected by:VIDEO VISIT: Text to cell phone:   Telephone Information:   Mobile [phone]       Will anyone else be joining the visit? NO  (If patient encounters technical issues they should call [phone])    Are changes needed to the allergy or medication list? Pt stated no changes to allergies and Pt stated no med changes    Are refills needed on medications prescribed by this physician? NO    Rooming Documentation:  Not applicable    Reason for visit: Consult    Venecia ROBERT
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
## 2025-04-22 NOTE — PROGRESS NOTES
Virtual Visit Details    Type of service:  Video Visit     Originating Location (pt. Location): Home    Distant Location (provider location):  On-site  Platform used for Video Visit: AmWell and Doximity due to technical issues

## 2025-04-23 NOTE — TELEPHONE ENCOUNTER
MEDICAL RECORDS REQUEST   Radiation Oncology  [address] [place] [address]  Fax: [phone]          FUTURE VISIT INFORMATION                                                   Arjun Crowder, : 1980 scheduled for future visit at [facility] Radiation Oncology    RECORDS REQUESTED FOR VISIT                                                     BRAIN STATUS DETAILS   OFFICE NOTE from neuro/neuro surg Epic 25: Dr. Manuel Osborne  3/12/25: Dr. Rosana Rosas   ED/HOSP ADMISSION NOTES Epic 3/4/25: [facility]   MEDICATION LIST [facility]    LABS     ANYTHING RELATED TO DIAGNOSIS Epic Most recent 25   IMAGING (NEED IMAGES & REPORT)     CT SCANS PACS 3/4/25: MRA Neck  3/4/25: MRA Brain  3/4/25: MR Brain

## 2025-04-29 DIAGNOSIS — Q27.30 AVM (ARTERIOVENOUS MALFORMATION): Primary | ICD-10-CM

## 2025-04-30 DIAGNOSIS — Q28.2 AVM (ARTERIOVENOUS MALFORMATION) BRAIN: Primary | ICD-10-CM
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
## 2025-05-09 ENCOUNTER — PRE VISIT (OUTPATIENT)
Dept: RADIATION ONCOLOGY | Facility: CLINIC | Age: 45
End: 2025-05-09
Payer: COMMERCIAL

## 2025-05-14 ENCOUNTER — PATIENT OUTREACH (OUTPATIENT)
Dept: NEUROSURGERY | Facility: CLINIC | Age: 45
End: 2025-05-14
Payer: COMMERCIAL

## 2025-05-14 NOTE — PROGRESS NOTES
Patient scheduled for a cerebral angiogram for radiosurgery on 7/3/25 at 0900.     Confirmed date/time. He saw Rad/Onc on 5/9/25 and had all questions answered at that time.     Informed that will have Sandy call to obtain angio phone consent sometime in the next few weeks. States he is available most anytime.     Queenie Hutson RN 5/14/2025 2:33 PM

## 2025-06-12 DIAGNOSIS — Q28.2 AVM (ARTERIOVENOUS MALFORMATION) BRAIN: Primary | ICD-10-CM

## 2025-07-03 ENCOUNTER — HOSPITAL ENCOUNTER (OUTPATIENT)
Facility: CLINIC | Age: 45
End: 2025-07-03
Attending: NEUROLOGICAL SURGERY | Admitting: NEUROLOGICAL SURGERY
Payer: COMMERCIAL

## 2025-07-03 ENCOUNTER — OFFICE VISIT (OUTPATIENT)
Dept: RADIATION ONCOLOGY | Facility: CLINIC | Age: 45
End: 2025-07-03
Attending: RADIOLOGY
Payer: COMMERCIAL

## 2025-07-03 ENCOUNTER — APPOINTMENT (OUTPATIENT)
Dept: INTERVENTIONAL RADIOLOGY/VASCULAR | Facility: CLINIC | Age: 45
End: 2025-07-03
Attending: NEUROLOGICAL SURGERY
Payer: COMMERCIAL

## 2025-07-03 ENCOUNTER — APPOINTMENT (OUTPATIENT)
Dept: MEDSURG UNIT | Facility: CLINIC | Age: 45
End: 2025-07-03
Attending: NEUROLOGICAL SURGERY
Payer: COMMERCIAL

## 2025-07-03 VITALS
RESPIRATION RATE: 20 BRPM | OXYGEN SATURATION: 97 % | DIASTOLIC BLOOD PRESSURE: 70 MMHG | SYSTOLIC BLOOD PRESSURE: 122 MMHG | HEART RATE: 75 BPM

## 2025-07-03 VITALS
SYSTOLIC BLOOD PRESSURE: 133 MMHG | OXYGEN SATURATION: 96 % | RESPIRATION RATE: 16 BRPM | DIASTOLIC BLOOD PRESSURE: 50 MMHG | HEART RATE: 79 BPM

## 2025-07-03 VITALS
OXYGEN SATURATION: 99 % | BODY MASS INDEX: 22.66 KG/M2 | TEMPERATURE: 98.3 F | SYSTOLIC BLOOD PRESSURE: 136 MMHG | RESPIRATION RATE: 16 BRPM | DIASTOLIC BLOOD PRESSURE: 73 MMHG | HEART RATE: 62 BPM | HEIGHT: 69 IN | WEIGHT: 153 LBS

## 2025-07-03 DIAGNOSIS — Q28.2 AVM (ARTERIOVENOUS MALFORMATION) BRAIN: Primary | ICD-10-CM

## 2025-07-03 DIAGNOSIS — Q27.30 AVM (ARTERIOVENOUS MALFORMATION): ICD-10-CM

## 2025-07-03 LAB
ANION GAP SERPL CALCULATED.3IONS-SCNC: 12 MMOL/L (ref 7–15)
APTT PPP: 28 SECONDS (ref 22–38)
BUN SERPL-MCNC: 13.8 MG/DL (ref 6–20)
CALCIUM SERPL-MCNC: 9.6 MG/DL (ref 8.8–10.4)
CHLORIDE SERPL-SCNC: 105 MMOL/L (ref 98–107)
CREAT SERPL-MCNC: 0.94 MG/DL (ref 0.67–1.17)
EGFRCR SERPLBLD CKD-EPI 2021: >90 ML/MIN/1.73M2
ERYTHROCYTE [DISTWIDTH] IN BLOOD BY AUTOMATED COUNT: 12.9 % (ref 10–15)
GLUCOSE SERPL-MCNC: 108 MG/DL (ref 70–99)
HCO3 SERPL-SCNC: 23 MMOL/L (ref 22–29)
HCT VFR BLD AUTO: 43.6 % (ref 40–53)
HGB BLD-MCNC: 14.4 G/DL (ref 13.3–17.7)
INR PPP: 0.94 (ref 0.85–1.15)
MCH RBC QN AUTO: 30.3 PG (ref 26.5–33)
MCHC RBC AUTO-ENTMCNC: 33 G/DL (ref 31.5–36.5)
MCV RBC AUTO: 92 FL (ref 78–100)
PLATELET # BLD AUTO: 282 10E3/UL (ref 150–450)
POTASSIUM SERPL-SCNC: 4.3 MMOL/L (ref 3.4–5.3)
PROTHROMBIN TIME: 12.9 SECONDS (ref 11.8–14.8)
RBC # BLD AUTO: 4.76 10E6/UL (ref 4.4–5.9)
SODIUM SERPL-SCNC: 140 MMOL/L (ref 135–145)
WBC # BLD AUTO: 9 10E3/UL (ref 4–11)

## 2025-07-03 PROCEDURE — 272N000566 HC SHEATH CR3

## 2025-07-03 PROCEDURE — 250N000011 HC RX IP 250 OP 636: Performed by: RADIOLOGY

## 2025-07-03 PROCEDURE — C1769 GUIDE WIRE: HCPCS

## 2025-07-03 PROCEDURE — 258N000003 HC RX IP 258 OP 636: Performed by: STUDENT IN AN ORGANIZED HEALTH CARE EDUCATION/TRAINING PROGRAM

## 2025-07-03 PROCEDURE — 99152 MOD SED SAME PHYS/QHP 5/>YRS: CPT

## 2025-07-03 PROCEDURE — 82947 ASSAY GLUCOSE BLOOD QUANT: CPT | Performed by: STUDENT IN AN ORGANIZED HEALTH CARE EDUCATION/TRAINING PROGRAM

## 2025-07-03 PROCEDURE — 250N000009 HC RX 250: Performed by: STUDENT IN AN ORGANIZED HEALTH CARE EDUCATION/TRAINING PROGRAM

## 2025-07-03 PROCEDURE — 80048 BASIC METABOLIC PNL TOTAL CA: CPT | Performed by: STUDENT IN AN ORGANIZED HEALTH CARE EDUCATION/TRAINING PROGRAM

## 2025-07-03 PROCEDURE — 36226 PLACE CATH VERTEBRAL ART: CPT | Mod: LT | Performed by: NEUROLOGICAL SURGERY

## 2025-07-03 PROCEDURE — 999N000134 HC STATISTIC PP CARE STAGE 3

## 2025-07-03 PROCEDURE — 250N000011 HC RX IP 250 OP 636: Performed by: STUDENT IN AN ORGANIZED HEALTH CARE EDUCATION/TRAINING PROGRAM

## 2025-07-03 PROCEDURE — C1760 CLOSURE DEV, VASC: HCPCS

## 2025-07-03 PROCEDURE — 85014 HEMATOCRIT: CPT | Performed by: STUDENT IN AN ORGANIZED HEALTH CARE EDUCATION/TRAINING PROGRAM

## 2025-07-03 PROCEDURE — 99152 MOD SED SAME PHYS/QHP 5/>YRS: CPT | Mod: GC | Performed by: NEUROLOGICAL SURGERY

## 2025-07-03 PROCEDURE — 999N000142 HC STATISTIC PROCEDURE PREP ONLY

## 2025-07-03 PROCEDURE — 36415 COLL VENOUS BLD VENIPUNCTURE: CPT | Performed by: STUDENT IN AN ORGANIZED HEALTH CARE EDUCATION/TRAINING PROGRAM

## 2025-07-03 PROCEDURE — 255N000002 HC RX 255 OP 636: Performed by: NEUROLOGICAL SURGERY

## 2025-07-03 PROCEDURE — C1887 CATHETER, GUIDING: HCPCS

## 2025-07-03 PROCEDURE — 250N000009 HC RX 250: Performed by: RADIOLOGY

## 2025-07-03 PROCEDURE — 250N000013 HC RX MED GY IP 250 OP 250 PS 637: Performed by: STUDENT IN AN ORGANIZED HEALTH CARE EDUCATION/TRAINING PROGRAM

## 2025-07-03 PROCEDURE — 36226 PLACE CATH VERTEBRAL ART: CPT

## 2025-07-03 PROCEDURE — 85730 THROMBOPLASTIN TIME PARTIAL: CPT | Performed by: STUDENT IN AN ORGANIZED HEALTH CARE EDUCATION/TRAINING PROGRAM

## 2025-07-03 PROCEDURE — 85610 PROTHROMBIN TIME: CPT | Performed by: STUDENT IN AN ORGANIZED HEALTH CARE EDUCATION/TRAINING PROGRAM

## 2025-07-03 PROCEDURE — 250N000013 HC RX MED GY IP 250 OP 250 PS 637: Performed by: RADIOLOGY

## 2025-07-03 PROCEDURE — 272N000506 HC NEEDLE CR6

## 2025-07-03 RX ORDER — IODIXANOL 320 MG/ML
100 INJECTION, SOLUTION INTRAVASCULAR ONCE
Status: COMPLETED | OUTPATIENT
Start: 2025-07-03 | End: 2025-07-03

## 2025-07-03 RX ORDER — ACETAMINOPHEN 325 MG/1
650 TABLET ORAL EVERY 4 HOURS PRN
Status: DISCONTINUED | OUTPATIENT
Start: 2025-07-03 | End: 2025-07-03 | Stop reason: HOSPADM

## 2025-07-03 RX ORDER — HYDROCODONE BITARTRATE AND ACETAMINOPHEN 5; 325 MG/1; MG/1
1-2 TABLET ORAL EVERY 6 HOURS PRN
Refills: 0 | Status: DISCONTINUED | OUTPATIENT
Start: 2025-07-03 | End: 2025-07-03 | Stop reason: HOSPADM

## 2025-07-03 RX ORDER — ACETAMINOPHEN AND CODEINE PHOSPHATE 300; 30 MG/1; MG/1
1 TABLET ORAL EVERY 4 HOURS PRN
Status: DISCONTINUED | OUTPATIENT
Start: 2025-07-03 | End: 2025-07-03 | Stop reason: HOSPADM

## 2025-07-03 RX ORDER — NALOXONE HYDROCHLORIDE 0.4 MG/ML
0.4 INJECTION, SOLUTION INTRAMUSCULAR; INTRAVENOUS; SUBCUTANEOUS
Status: DISCONTINUED | OUTPATIENT
Start: 2025-07-03 | End: 2025-07-03 | Stop reason: HOSPADM

## 2025-07-03 RX ORDER — ACETAMINOPHEN 325 MG/1
325 TABLET ORAL EVERY 4 HOURS PRN
Status: ACTIVE | OUTPATIENT
Start: 2025-07-03

## 2025-07-03 RX ORDER — ONDANSETRON 4 MG/1
8 TABLET, ORALLY DISINTEGRATING ORAL EVERY 6 HOURS PRN
Status: DISCONTINUED | OUTPATIENT
Start: 2025-07-03 | End: 2025-07-03 | Stop reason: HOSPADM

## 2025-07-03 RX ORDER — OXYCODONE HYDROCHLORIDE 10 MG/1
10 TABLET ORAL ONCE
Refills: 0 | Status: COMPLETED | OUTPATIENT
Start: 2025-07-03 | End: 2025-07-03

## 2025-07-03 RX ORDER — NALOXONE HYDROCHLORIDE 0.4 MG/ML
0.2 INJECTION, SOLUTION INTRAMUSCULAR; INTRAVENOUS; SUBCUTANEOUS
Status: DISCONTINUED | OUTPATIENT
Start: 2025-07-03 | End: 2025-07-03 | Stop reason: HOSPADM

## 2025-07-03 RX ORDER — HEPARIN SODIUM 200 [USP'U]/100ML
1 INJECTION, SOLUTION INTRAVENOUS EVERY 5 MIN PRN
Status: DISCONTINUED | OUTPATIENT
Start: 2025-07-03 | End: 2025-07-03 | Stop reason: HOSPADM

## 2025-07-03 RX ORDER — ONDANSETRON 2 MG/ML
4 INJECTION INTRAMUSCULAR; INTRAVENOUS
Status: DISCONTINUED | OUTPATIENT
Start: 2025-07-03 | End: 2025-07-03 | Stop reason: HOSPADM

## 2025-07-03 RX ORDER — FENTANYL CITRATE 50 UG/ML
25-50 INJECTION, SOLUTION INTRAMUSCULAR; INTRAVENOUS EVERY 5 MIN PRN
Refills: 0 | Status: DISCONTINUED | OUTPATIENT
Start: 2025-07-03 | End: 2025-07-03 | Stop reason: HOSPADM

## 2025-07-03 RX ORDER — FLUMAZENIL 0.1 MG/ML
0.2 INJECTION, SOLUTION INTRAVENOUS
Status: DISCONTINUED | OUTPATIENT
Start: 2025-07-03 | End: 2025-07-03 | Stop reason: HOSPADM

## 2025-07-03 RX ORDER — LORAZEPAM 0.5 MG/1
.5-2 TABLET ORAL
Status: COMPLETED | OUTPATIENT
Start: 2025-07-03 | End: 2025-07-03

## 2025-07-03 RX ORDER — ACETAMINOPHEN 500 MG
1000 TABLET ORAL ONCE
Status: COMPLETED | OUTPATIENT
Start: 2025-07-03 | End: 2025-07-03

## 2025-07-03 RX ORDER — SODIUM CHLORIDE 9 MG/ML
INJECTION, SOLUTION INTRAVENOUS CONTINUOUS
Status: DISCONTINUED | OUTPATIENT
Start: 2025-07-03 | End: 2025-07-03 | Stop reason: HOSPADM

## 2025-07-03 RX ORDER — ONDANSETRON 4 MG/1
4 TABLET, ORALLY DISINTEGRATING ORAL EVERY 6 HOURS PRN
Status: ACTIVE | OUTPATIENT
Start: 2025-07-03

## 2025-07-03 RX ORDER — PROCHLORPERAZINE MALEATE 5 MG/1
10 TABLET ORAL EVERY 6 HOURS PRN
Status: ACTIVE | OUTPATIENT
Start: 2025-07-03

## 2025-07-03 RX ORDER — LORAZEPAM 0.5 MG/1
.5-1 TABLET ORAL
Status: DISCONTINUED | OUTPATIENT
Start: 2025-07-03 | End: 2025-07-03 | Stop reason: HOSPADM

## 2025-07-03 RX ORDER — LIDOCAINE 40 MG/G
1 CREAM TOPICAL SEE ADMIN INSTRUCTIONS
Status: COMPLETED | OUTPATIENT
Start: 2025-07-03 | End: 2025-07-03

## 2025-07-03 RX ORDER — ONDANSETRON 2 MG/ML
4 INJECTION INTRAMUSCULAR; INTRAVENOUS EVERY 6 HOURS PRN
Status: ACTIVE | OUTPATIENT
Start: 2025-07-03

## 2025-07-03 RX ORDER — LIDOCAINE 40 MG/G
CREAM TOPICAL
Status: DISCONTINUED | OUTPATIENT
Start: 2025-07-03 | End: 2025-07-03 | Stop reason: HOSPADM

## 2025-07-03 RX ORDER — SODIUM CHLORIDE 9 MG/ML
INJECTION, SOLUTION INTRAVENOUS CONTINUOUS
Status: ACTIVE | OUTPATIENT
Start: 2025-07-03

## 2025-07-03 RX ADMIN — MIDAZOLAM 1 MG: 1 INJECTION INTRAMUSCULAR; INTRAVENOUS at 10:31

## 2025-07-03 RX ADMIN — SODIUM CHLORIDE: 0.9 INJECTION, SOLUTION INTRAVENOUS at 09:04

## 2025-07-03 RX ADMIN — FENTANYL CITRATE 25 MCG: 50 INJECTION, SOLUTION INTRAMUSCULAR; INTRAVENOUS at 10:32

## 2025-07-03 RX ADMIN — LORAZEPAM 1 MG: 0.5 TABLET ORAL at 05:34

## 2025-07-03 RX ADMIN — IODIXANOL 15 ML: 320 INJECTION, SOLUTION INTRAVASCULAR at 10:46

## 2025-07-03 RX ADMIN — ACETAMINOPHEN 1000 MG: 500 TABLET ORAL at 11:31

## 2025-07-03 RX ADMIN — LIDOCAINE HYDROCHLORIDE 10 ML: 10 INJECTION, SOLUTION EPIDURAL; INFILTRATION; INTRACAUDAL; PERINEURAL at 10:49

## 2025-07-03 RX ADMIN — MIDAZOLAM 1 MG: 1 INJECTION INTRAMUSCULAR; INTRAVENOUS at 10:11

## 2025-07-03 RX ADMIN — LIDOCAINE HYDROCHLORIDE,EPINEPHRINE BITARTRATE 30 ML: 20; .01 INJECTION, SOLUTION INFILTRATION; PERINEURAL at 05:35

## 2025-07-03 RX ADMIN — FENTANYL CITRATE 50 MCG: 50 INJECTION, SOLUTION INTRAMUSCULAR; INTRAVENOUS at 10:11

## 2025-07-03 RX ADMIN — HEPARIN SODIUM 4 BAG: 200 INJECTION, SOLUTION INTRAVENOUS at 10:50

## 2025-07-03 RX ADMIN — LIDOCAINE 1 G: 40 CREAM TOPICAL at 05:35

## 2025-07-03 RX ADMIN — OXYCODONE HYDROCHLORIDE 10 MG: 10 TABLET ORAL at 12:14

## 2025-07-03 RX ADMIN — FENTANYL CITRATE 25 MCG: 50 INJECTION, SOLUTION INTRAMUSCULAR; INTRAVENOUS at 10:18

## 2025-07-03 ASSESSMENT — ACTIVITIES OF DAILY LIVING (ADL)
ADLS_ACUITY_SCORE: 41

## 2025-07-03 ASSESSMENT — PAIN SCALES - GENERAL: PAINLEVEL_OUTOF10: NO PAIN (0)

## 2025-07-03 NOTE — PROGRESS NOTES
[facility]     Endovascular Surgical Neuroradiology Post-Procedure Note    Pre-Procedure Diagnosis: avm  Post-Procedure Diagnosis: avm    Procedure:   Diagnostic cervicocerebral angiography    Reason for delay:  Not applicable    Findings: Spetzler Waldemar grade III and supplementary grade 5 left medial temporal lobe arteriovenous malformation, supplied by temporal branches of the middle cerebral and posterior cerebral arteries, with superficial drainage into cortical veins, along with deep venous drainage via the basal vein of Elvis into the straight sinus. The nidus is diffuse, measuring approximately 4 x 3.6cm from contributions of the posterior cerebral artery, along with an anterior component measuring approximately 1.9 x 1.1cm from contributions of the middle cerebral artery.     Plan:    Bedrest of 2 hours  Proceed with GKS    Follow-up imaging: NA  Blood pressure goal: 120-180  Antithrombotic plan: NA  Neuro-check frequency: q4    Primary Surgeon: Dr. Manuel Wolf  Secondary Surgeon: Not applicable  Secondary Surgeon Review: None  Fellow: Ben Mojica    Prior to the start of the procedure and with procedural staff participation, I verbally confirmed: the patient s identity using two indicators, relevant allergies, that the procedure was appropriate and matched the consent or emergent situation, and that the correct equipment/implants were available. Immediately prior to starting the procedure I conducted the Time Out with the procedural staff and re-confirmed the patient s name, procedure, and site/side. (The Joint Commission universal protocol was followed.)  Yes    PRU value: Not applicable    Anesthesia: Conscious Sedation  Medications: Lidocaine versed  Puncture site: Right Femoral Artery    Fluoroscopy time (minutes): 4.4  Radiation dose (mGy): 161.3    Contrast amount (mL): 15     Estimated blood loss (mL): 20cc    Closure: 6 F Angio-seal    Bedrest  start time 10:40 and 2 hours bedrest completed at 12:40    Disposition: proceed with GKS      Sedation Post-Procedure Summary    The patient was reevaluated immediately before administering moderate or deep sedation or anesthesia.    Sedatives: Fentanyl   and Midazolam (Versed)      Vital signs and pulse oximetry were monitored and remained stable throughout the procedure, and sedation was maintained until the procedure was complete.  The patient was monitored by staff until sedation discharge criteria were met.    Patient tolerance: Patient tolerated the procedure well with no immediate complications.    Time of sedation in minutes: 40 minutes from beginning to end of physician one to one monitoring.    Ben Mojica MD
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
## 2025-07-03 NOTE — IR NOTE
Patient Name: Arjun Crowder  Medical Record Number: [id number]  Today's Date: 7/3/2025    Procedure: Diagnostic Cerebral Angiogram  Proceduralist: Dr. Wolf and Dr. Mojica  Pathology present: NA    Procedure Start: 1008  Procedure end: 1033  Sedation medications administered: Midazolam 2 mg, Fentanyl 100 mcg       Report given to: 2A RN  : JOSE    Other Notes: Pt arrived to IR room 3 from . Consent reviewed. Pt denies any questions or concerns regarding procedure. Pt positioned supine and monitored per protocol. Pt tolerated procedure without any noted complications. Pt transferred back to .    Sheath removed at 1032. Angioseal Closure device deployed 1032   Groin site appearance: dry/flat/intact  Pedal pulse assessment:  WDL; +2.   Bedrest for 2 hours.

## 2025-07-03 NOTE — PROGRESS NOTES
[facility]  Department of Neurosurgery  Operative Report    PROCEDURE DATE: 7/3/2025    PROCEDURES:  1. Placement of Leksell Stereotactic head frame  2. Stereotactic MRI of the Brain  3. Generation of Stereotactic Treatment Plan  4. Gamma Knife Treatment of complex lesion (arteriovenous malformation) - Stage 1    RADIATION ONCOLOGIST: Molly eDl Angel MD    RADIATION PHYSICIST:  Elieser Santoyo PhD    ENDOVASCULAR SURGEON: Manuel Wolf MD    NEUROSURGEON: Manuel Osborne MD     INDICATIONS FOR PROCEDURE:    Patient is a 44 year old right-handed male who presented with severe headache and AMS episodes.  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 Given the location of the AVM, radiosurgery was recommended by the IR team and he was referred for assistance in management.  We reviewed the options for management including observation, radiation (staged), or surgical resection and we reviewed the risks and benefits of each approach. Patient agreed to proceed and informed consent was obtained.     DESCRIPTION OF PROCEDURE:      After obtaining informed consent, the patient was brought to the Gamma Knife Suite and was correctly identified. The Leksell stereotactic head frame was then secured to the patient's head using four pins. The intended pin sites were prepared in the usual sterile fashion and infiltrated with buffered 1% lidocaine with epinephrine (1:100,000).  Care was taken not to over-tighten the pins to avoid torque on the frame.  Accurate and secure placement of the frame was verified.  Skull geometry measurements were obtained and verified, and a fiducial localizer box was affixed to the head frame.    The patient was then transferred to the MRI scanner, and a stereotactic MRI of the brain was obtained. Next, an angiogram was performed by the IR team. The MRI and angiogram images were transferred to the Gamma Knife treatment planning station, and a three-dimensional stereotactic  treatment plan was generated. Angiogram AP and lateral views from the PCA feeders (only a vertebral artery angiogram was performed today) were chosen and incorporated into the plan. The MRI and angiogram images were fused and a model of the entire AVM was generated. The total volume of the nidus was approximately 12.4cm3. We then outlined the anterior portion of the AVM away from the venous drainage for the first stage. We created a three-dimensional volume representation of the anterior ~8.2cc of AVM. A treatment plan was then generated using 47 shots of mixed collimator sizes. Our radiation oncologist then prescribed a 18 Gy treatment dose at the 55% isodose line. The brainstem was outlined as a risk structure, and 0.018cm3 receives a dose of >= 12.0 Gy.    The plan was then turned over to our radiation physicist for . After skull geometry measurements were verified by Radiation Oncology staff, the patient was placed on the Gamma Knife couch and treatment carried out.    At the completion of treatment, the patient was taken out of the treatment unit, and the stereotactic head frame removed.  The pin sites were inspected for integrity.  A sterile dressing was applied and the patient was given discharge instructions and a plan for follow-up.    I was present for all neurosurgical portions of the procedure.    Manuel Osborne MD

## 2025-07-03 NOTE — LETTER
7/3/2025      Arjun Crowder  [address]  [place] [address]      Dear Colleague,    Thank you for referring your patient, Arjun Crowder, to the [facility] RADIATION ONCOLOGY. Please see a copy of my visit note below.    [facility]  Department of Neurosurgery  Operative Report    PROCEDURE DATE: 7/3/2025    PROCEDURES:  1. Placement of Leksell Stereotactic head frame  2. Stereotactic MRI of the Brain  3. Generation of Stereotactic Treatment Plan  4. Gamma Knife Treatment of complex lesion (arteriovenous malformation) - Stage 1    RADIATION ONCOLOGIST: Molly Del Angel MD    RADIATION PHYSICIST:  Elieser Santoyo PhD    ENDOVASCULAR SURGEON: Manuel Wolf MD    NEUROSURGEON: Manuel Osborne MD     INDICATIONS FOR PROCEDURE:    Patient is a 44 year old right-handed male who presented with severe headache and AMS episodes.  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 Given the location of the AVM, radiosurgery was recommended by the IR team and he was referred for assistance in management.  We reviewed the options for management including observation, radiation (staged), or surgical resection and we reviewed the risks and benefits of each approach. Patient agreed to proceed and informed consent was obtained.     DESCRIPTION OF PROCEDURE:      After obtaining informed consent, the patient was brought to the Gamma Knife Suite and was correctly identified. The Leksell stereotactic head frame was then secured to the patient's head using four pins. The intended pin sites were prepared in the usual sterile fashion and infiltrated with buffered 1% lidocaine with epinephrine (1:100,000).  Care was taken not to over-tighten the pins to avoid torque on the frame.  Accurate and secure placement of the frame was verified.  Skull geometry measurements were obtained and verified, and a fiducial localizer box was affixed to the head frame.    The patient was then transferred to the  MRI scanner, and a stereotactic MRI of the brain was obtained. Next, an angiogram was performed by the IR team. The MRI and angiogram images were transferred to the Gamma Knife treatment planning station, and a three-dimensional stereotactic treatment plan was generated. Angiogram AP and lateral views from the PCA feeders (only a vertebral artery angiogram was performed today) were chosen and incorporated into the plan. The MRI and angiogram images were fused and a model of the entire AVM was generated. The total volume of the nidus was approximately 12.4cm3. We then outlined the anterior portion of the AVM away from the venous drainage for the first stage. We created a three-dimensional volume representation of the anterior ~8.2cc of AVM. A treatment plan was then generated using 47 shots of mixed collimator sizes. Our radiation oncologist then prescribed a 18 Gy treatment dose at the 55% isodose line. The brainstem was outlined as a risk structure, and 0.018cm3 receives a dose of >= 12.0 Gy.    The plan was then turned over to our radiation physicist for . After skull geometry measurements were verified by Radiation Oncology staff, the patient was placed on the Gamma Knife couch and treatment carried out.    At the completion of treatment, the patient was taken out of the treatment unit, and the stereotactic head frame removed.  The pin sites were inspected for integrity.  A sterile dressing was applied and the patient was given discharge instructions and a plan for follow-up.    I was present for all neurosurgical portions of the procedure.    Manuel Osborne MD    Again, thank you for allowing me to participate in the care of your patient.        Sincerely,        Manuel Osborne MD    Electronically signed

## 2025-07-03 NOTE — PROGRESS NOTES
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 Osborne, Tumor Registry      GAMMA KNIFE PROCEDURE NOTE and TREATMENT SUMMARY    Treatment Summary:  Radiation Oncology - Course: 1 Protocol:    Treatment Site Current Dose Modality From To Elapsed Days Fx.   1 L Temporal AVM #1  1,802 cGy Co-60  2025   1                        DESCRIPTION OF PROCEDURE:  On 2025the patient was brought to the Gamma Knife suite at the [facility].      HEAD IMMOBILIZATION: Head frame put on by the neurosurgeon  MEDICATION: Sedation and local anesthetic    The patient was then taken to the Department of Radiology where a stereotactic brain MRI was performed.  An Angiogram was then performed.  The patient was admitted to the  care area while treatment planning was completed.      These Images were then sent to the [facility] Gamma Knife computer system where a three dimensional stereotactic plan was generated.   The patient was then treated on the Leksell Gamma Knife.  Treatment involved 1 target.    The Leksell Gamma Knife EspritTM Plan software was used to create a highly conformal dose distribution using the number and size collimators detailed above.     TREATMENT:    The patient was brought to the Gamma Knife suite.  The patient was identified by 2 methods. A timeout was performed to confirm the correct patient and correct procedure. The site was identified by an MRI image and treatment planning software, which defined the treatment area.     A cone beam CT was done and compared to the MRI to look for frame motion  We evaluated the frame manually to ensure it was still secure.     The treatment was delivered using the Lesksell Gamma Knife EspritTM without complication.  The head immobilization was removed and the patient was  discharged home in stable condition.  The patient tolerated the treatment well and had no complications.        PAIN MANAGEMENT: None required      FOLLOW-UP PLANS:  Follow up: 3 Months      Imaging Before Follow Up: Brain MRI    Approved by:  Molly Del Angel MD/PhD    PHYSICIST: Elieser Santoyo, PhD

## 2025-07-03 NOTE — LETTER
7/3/2025      Arjun Crowder  [address]  [place] [address]      Dear Colleague,    Thank you for referring your patient, Arjun Crowder, to the [facility] RADIATION ONCOLOGY. Please see a copy of my visit note below.    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 Osborne, Tumor Registry      GAMMA KNIFE PROCEDURE NOTE and TREATMENT SUMMARY    Treatment Summary:  Radiation Oncology - Course: 1 Protocol:    Treatment Site Current Dose Modality From To Elapsed Days Fx.   1 L Temporal AVM #1  1,802 cGy Co-60  2025   1                        DESCRIPTION OF PROCEDURE:  On 2025the patient was brought to the Gamma Knife suite at the [facility].      HEAD IMMOBILIZATION: Head frame put on by the neurosurgeon  MEDICATION: Sedation and local anesthetic    The patient was then taken to the Department of Radiology where a stereotactic brain MRI was performed.  An Angiogram was then performed.  The patient was admitted to the  care area while treatment planning was completed.      These Images were then sent to the [employer] Gamma Knife computer system where a three dimensional stereotactic plan was generated.   The patient was then treated on the Leksell Gamma Knife.  Treatment involved 1 target.    The Leksell Gamma Knife EspritTM Plan software was used to create a highly conformal dose distribution using the number and size collimators detailed above.     TREATMENT:    The patient was brought to the Gamma Knife suite.  The patient was identified by 2 methods. A timeout was performed to confirm the correct patient and correct procedure. The site was identified by an MRI image and treatment planning software, which defined the treatment area.     A cone beam CT was done and compared to the  MRI to look for frame motion  We evaluated the frame manually to ensure it was still secure.     The treatment was delivered using the Lesksell Gamma Knife EspritTM without complication.  The head immobilization was removed and the patient was discharged home in stable condition.  The patient tolerated the treatment well and had no complications.        PAIN MANAGEMENT: None required      FOLLOW-UP PLANS:  Follow up: 3 Months      Imaging Before Follow Up: Brain MRI    Approved by:  Molly Del Angel MD/PhD    PHYSICIST: Elieser Santoyo, PhD    Again, thank you for allowing me to participate in the care of your patient.        Sincerely,        Molly Del Angel MD    Electronically signed

## 2025-07-03 NOTE — DISCHARGE INSTRUCTIONS
[facility]         Interventional Radiology  Discharge Instructions Post Angiogram (NEURO)    AFTER YOU GO HOME  If you were given sedation, for the first 24 hours: we recommend that an adult stay with you, DO NOT drive or operate machinery at home or at work, DO NOT smoke, DO NOT make any important or legal decisions.  DO NOT drink alcoholic beverages the day of your procedure  DO relax and take it easy for 24 hours  DO drink plenty of fluids  DO resume your regular diet, unless otherwise instructed by your Primary Physician  DO NOT take a shower for at least 12 hours following your procedure. No tub bath, hot tubs, or swimming for 5 days. Do NOT scrub the procedure site vigorously for 5 days.  If you are taking a medication called Glucophage, Glucovance, or Metformin; these medications need to be held for approximately 48 hours following the procedure.       Care of groin site  It is normal to have a small bruise or lump at the site.  For the first 2 days, when you cough, sneeze or move your bowels, hold your hand over the puncture site and press gently.  Do NOT lift more than 10 pounds or do any strenuous exercise for at least 3 to 5 days.  Do not use lotion or powder near the puncture site for 3 days.  If you start bleeding from the site in your groin: lie down flat and press firmly on the site. Call your doctor as soon as you can.   Call 911 right away if you have: Heavy bleeding, bleeding that does not stop.      CALL THE PHYSICIAN IF:  You start bleeding from the procedure site.  You have numbness, coolness or change in color of the arm or leg of the puncture site  You experience changes in your vision, hearing, balance, coordination, speech, thinking or memory  You experience weakness in one or more extremity  You experience pain or redness at the puncture site  You develop nausea or vomiting  You develop hives or a rash or unexplained itching  You develop a temperature of 101 degrees F  or greater      Stroke - Call 911    Remember: BE FAST       BALANCE--Sudden loss of balance or coordination. Example: trouble walking     EYES--Sudden problem seeing out of one or both eyes     FACE--Sudden numbness or change to one side of the face. Examples: facial droop, uneven smile     ARM--Sudden numbness or weakness in one arm or leg     SPEECH--Sudden changes in speech or talking that doesn t make sense     TIME--if the person is having any of the symptoms above, CALL 911 immediately.      Symptoms may go away, then come back.     Sudden, worst headache of your life      ADDITIONAL INSTRUCTIONS  Instruction booklet given: ***    Procedure Physician: Dr Good    Date of procedure: 7/3/2025    If you have questions or concerns, call the RN Care Coordinators: Queenie Hutson or Lucía Richards at [phone], option 1, Monday-Friday 8:00 am to 4:30 pm. After business hours, call the  at [phone], option #4 for , and have the Neuro-Interventional Fellow paged. Someone is on call 24 hrs/day    [facility] toll free number: [phone] Monday-Friday 8:00 am to 4:30 pm  [facility] Emergency Dept: [phone]
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
## 2025-07-03 NOTE — PROGRESS NOTES
Halo screws are hurting head.  Tylenol given without effect.  Oxycodone given, will monitor.  Reviewed discharge instructions with Arjun.  Will discharge to Gamma Knife when calculations are complete.

## 2025-07-03 NOTE — PROGRESS NOTES
Pt on 2A per litter with RN post Cerebral Angiogram, pt awake and alert, denies pain. Site at right groin is flat, dry and intact; covered in guaze with clear dressing over;no hematoma. Family updated.  Neuro check unchanged from pre procedure. Will continue to monitor.

## 2025-07-03 NOTE — PROGRESS NOTES
Pt awake and alert, reports headache has improved after meds. Neuros remain unchanged; dr Mojica at bedside; site is flat, clean dry and intact. Pt declined urinal. Gamma staff here to take to next procedure; traveled on litter; phone, discontinue papers in shorts on bed. Pt denies questions; previous RNMara reviewed discharge instructions. IV capped.

## 2025-07-04 ASSESSMENT — ACTIVITIES OF DAILY LIVING (ADL)
ADLS_ACUITY_SCORE: 41

## 2025-07-05 ASSESSMENT — ACTIVITIES OF DAILY LIVING (ADL)
ADLS_ACUITY_SCORE: 41

## 2025-07-06 ASSESSMENT — ACTIVITIES OF DAILY LIVING (ADL)
ADLS_ACUITY_SCORE: 41

## 2025-07-07 ENCOUNTER — PATIENT OUTREACH (OUTPATIENT)
Dept: NEUROSURGERY | Facility: CLINIC | Age: 45
End: 2025-07-07
Payer: COMMERCIAL

## 2025-07-07 ASSESSMENT — ACTIVITIES OF DAILY LIVING (ADL)
ADLS_ACUITY_SCORE: 41

## 2025-07-07 NOTE — PROGRESS NOTES
"Endovascular Surgical Neuroradiology - Post Discharge Call    Admit: 7/3/25    Discharge: 7/3/25    Facility: [facility]    MD/Service: Dr. Wolf for Dr. Rosas/REENA    Procedure Diagnosis: AVM     Procedure:   Diagnostic cervicocerebral angiography for Gamma Knife planning     Findings: Spetzler Waldemar grade III and supplementary grade 5 left medial temporal lobe arteriovenous malformation, supplied by temporal branches of the middle cerebral and posterior cerebral arteries, with superficial drainage into cortical veins, along with deep venous drainage via the basal vein of Elvis into the straight sinus. The nidus is diffuse, measuring approximately 4 x 3.6cm from contributions of the posterior cerebral artery, along with an anterior component measuring approximately 1.9 x 1.1cm from contributions of the middle cerebral artery.      Plan:  Per [facility] RNCC:  \"Per Dr. Osborne, patient will need a brain MRI and follow-up appointment with Chaya, post MRI, in 4 months.     The plan is for stage II of gamma knife, per Chaya/Dr. Osborne directive, after his follow-up appointment.     Queenie/Lucía, per Dr. Osborne he will need another angiogram on the same day as stage II GK.   I'll be in contact with you, after the 4 month follow-up appointment, to look at open dates.\"    Spoke with patient. States 48 hours post treatment he became a little nauseous with 2 episodes of vomiting. Also, headache. By 1400 Saturday felt fairly well. Back to baseline Sunday/today.     Denies bleeding, drainage, pain, swelling, warmth, redness at groin puncture site.     Occipital pin sites scant clear drainage for 24 hours, which has since resolved.     Eating and drinking ok.     Patient aware of the plan and will await call from scheduling. Patient has my contact information and was encouraged to call with questions/concerns.     Queenie Hutson RN 7/7/2025 3:29 PM                 "

## 2025-07-08 ENCOUNTER — TRANSCRIBE ORDERS (OUTPATIENT)
Dept: NEUROSURGERY | Facility: CLINIC | Age: 45
End: 2025-07-08
Payer: COMMERCIAL

## 2025-07-08 DIAGNOSIS — Q28.2 AVM (ARTERIOVENOUS MALFORMATION) BRAIN: Primary | ICD-10-CM

## 2025-07-08 ASSESSMENT — ACTIVITIES OF DAILY LIVING (ADL)
ADLS_ACUITY_SCORE: 41

## 2025-07-09 ASSESSMENT — ACTIVITIES OF DAILY LIVING (ADL)
ADLS_ACUITY_SCORE: 41

## 2025-07-10 ASSESSMENT — ACTIVITIES OF DAILY LIVING (ADL)
ADLS_ACUITY_SCORE: 41
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
## 2025-07-22 ENCOUNTER — TELEPHONE (OUTPATIENT)
Dept: NEUROSURGERY | Facility: CLINIC | Age: 45
End: 2025-07-22
Payer: COMMERCIAL

## (undated) RX ORDER — HEPARIN SODIUM 1000 [USP'U]/ML
INJECTION, SOLUTION INTRAVENOUS; SUBCUTANEOUS
Status: DISPENSED
Start: 2025-07-03

## (undated) RX ORDER — LORAZEPAM 0.5 MG/1
TABLET ORAL
Status: DISPENSED
Start: 2025-07-03

## (undated) RX ORDER — OXYCODONE HYDROCHLORIDE 10 MG/1
TABLET ORAL
Status: DISPENSED
Start: 2025-07-03

## (undated) RX ORDER — FENTANYL CITRATE 50 UG/ML
INJECTION, SOLUTION INTRAMUSCULAR; INTRAVENOUS
Status: DISPENSED
Start: 2025-07-03

## (undated) RX ORDER — LIDOCAINE HYDROCHLORIDE 10 MG/ML
INJECTION, SOLUTION INFILTRATION; PERINEURAL
Status: DISPENSED
Start: 2025-04-16

## (undated) RX ORDER — SODIUM CHLORIDE 9 MG/ML
INJECTION, SOLUTION INTRAVENOUS
Status: DISPENSED
Start: 2025-07-03

## (undated) RX ORDER — LIDOCAINE HYDROCHLORIDE 10 MG/ML
INJECTION, SOLUTION EPIDURAL; INFILTRATION; INTRACAUDAL; PERINEURAL
Status: DISPENSED
Start: 2025-07-03

## (undated) RX ORDER — ACETAMINOPHEN 500 MG
TABLET ORAL
Status: DISPENSED
Start: 2025-07-03

## (undated) RX ORDER — HEPARIN SODIUM 200 [USP'U]/100ML
INJECTION, SOLUTION INTRAVENOUS
Status: DISPENSED
Start: 2025-04-16

## (undated) RX ORDER — LIDOCAINE 40 MG/G
CREAM TOPICAL
Status: DISPENSED
Start: 2025-07-03